# Patient Record
Sex: MALE | Race: WHITE | Employment: FULL TIME | URBAN - METROPOLITAN AREA
[De-identification: names, ages, dates, MRNs, and addresses within clinical notes are randomized per-mention and may not be internally consistent; named-entity substitution may affect disease eponyms.]

---
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
## 2019-09-26 ENCOUNTER — HOSPITAL ENCOUNTER (EMERGENCY)
Facility: CLINIC | Age: 19
Discharge: HOME | End: 2019-09-26
Attending: INTERNAL MEDICINE | Admitting: INTERNAL MEDICINE

## 2019-09-26 ENCOUNTER — APPOINTMENT (OUTPATIENT)
Dept: GENERAL RADIOLOGY | Facility: CLINIC | Age: 19
End: 2019-09-26
Attending: EMERGENCY MEDICINE
Payer: COMMERCIAL

## 2019-09-26 VITALS
DIASTOLIC BLOOD PRESSURE: 83 MMHG | SYSTOLIC BLOOD PRESSURE: 128 MMHG | TEMPERATURE: 99 F | RESPIRATION RATE: 18 BRPM | OXYGEN SATURATION: 100 % | HEART RATE: 56 BPM

## 2019-09-26 DIAGNOSIS — S52.601A CLOSED FRACTURE OF DISTAL ENDS OF RIGHT RADIUS AND ULNA, INITIAL ENCOUNTER: ICD-10-CM

## 2019-09-26 DIAGNOSIS — S52.501A CLOSED FRACTURE OF DISTAL ENDS OF RIGHT RADIUS AND ULNA, INITIAL ENCOUNTER: ICD-10-CM

## 2019-09-26 PROCEDURE — 99284 EMERGENCY DEPT VISIT MOD MDM: CPT | Mod: 25

## 2019-09-26 PROCEDURE — 73110 X-RAY EXAM OF WRIST: CPT | Mod: RT

## 2019-09-26 PROCEDURE — 25600 CLTX DST RDL FX/EPHYS SEP WO: CPT | Mod: RT

## 2019-09-26 PROCEDURE — 99284 EMERGENCY DEPT VISIT MOD MDM: CPT

## 2019-09-26 ASSESSMENT — ENCOUNTER SYMPTOMS
ARTHRALGIAS: 1
MYALGIAS: 1
ABDOMINAL PAIN: 0

## 2019-09-26 NOTE — ED AVS SNAPSHOT
[facility] Emergency Department  [address]  [facility] [address]  Phone:  [phone]  Fax:  [phone]                                    Rashaad Nicole   MRN: [id number]    Department:  [facility] Emergency Department   Date of Visit:  9/26/2019           After Visit Summary Signature Page    I have received my discharge instructions, and my questions have been answered. I have discussed any challenges I see with this plan with the nurse or doctor.    ..........................................................................................................................................  Patient/Patient Representative Signature      ..........................................................................................................................................  Patient Representative Print Name and Relationship to Patient    ..................................................               ................................................  Date                                   Time    ..........................................................................................................................................  Reviewed by Signature/Title    ...................................................              ..............................................  Date                                               Time          22EPIC Rev 08/18

## 2019-09-27 NOTE — ED TRIAGE NOTES
Patient was skateboarding and fell, braced himself with his hands. Now has pain and swelling to right wrist. Denies hitting head. ABCs intact. Took 600mg motrin at home.

## 2019-09-27 NOTE — ED PROVIDER NOTES
History     Chief Complaint:  Right Wrist Pain     The history is provided by the patient.      Rashaad Nicole is a right-hand dominant 19 year old male who presents with concerns for left wrist pain. He reports he fell off skateboarding while turning, resulting in him landing on his butt with his right arm outstretched behind him. Rashaad endorses pain and swelling since though denies any syncope, chest pain, abdominal pain, head injury, or other injuries. Patient reports taking 600 mg of ibuprofen prior to arrival.     Allergies:  No Known Drug Allergies    Medications:    Medications reviewed. No current medications.     Past Medical History:    Medical history reviewed. No pertinent medical history.    Past Surgical History:    Surgical history reviewed. No pertinent surgical history.    Family History:    Family history reviewed. No pertinent family history.     Social History:  Accompanied by mom.  Never Smoker     Review of Systems   Cardiovascular: Negative for chest pain.   Gastrointestinal: Negative for abdominal pain.   Musculoskeletal: Positive for arthralgias and myalgias.   Neurological: Negative for syncope.       Physical Exam   Patient Vitals for the past 24 hrs:   BP Temp Temp src Pulse Resp SpO2   09/26/19 2207 128/83 99  F (37.2  C) Oral 56 18 100 %     Physical Exam  Cardiovascular:      Pulses: Normal pulses.   Musculoskeletal:      Comments: Swelling, tenderness, decreased range of motion at right wrist.   Skin:     Findings: No abrasion, ecchymosis or laceration.   Neurological:      Mental Status: He is alert.      Sensory: No sensory deficit.   Psychiatric:         Behavior: Behavior is cooperative.         Emergency Department Course     Imaging:  XR Wrist Right G/E 3 Views  Nondisplaced fracture of the distal radial metaphysis. Minimal displaced ulnar styloid fracture.  Reading per radiology    Procedures:    Right upper Extremity Splint Placement     I applied a sugar-tong  fiberglass splint to his right upper extremity and after placement I checked and adjusted the fit to ensure proper positioning. Patient was more comfortable with splint in place. Sensation and circulation are intact after splint placement.    Emergency Department Course:  Nursing notes and vitals reviewed.  2303 The patient was sent for a XR while in the emergency department, results above.   2315 I performed an exam of the patient as documented above. Splint placed as above. I personally reviewed the imaging results with the patient and answered all related questions prior to discharge, anticipatory guidance given.    Impression & Plan      Medical Decision Making:  Rashaad Nicole is a 19 year old male who presents to the emergency department today for evaluation of right wrist injury.  He does have a nondisplaced fracture of the distal radius and an ulnar styloid fracture.  He was splinted as noted and placed in a sling.  I will have him follow-up with orthopedics in about 5 days for anticipated casting.  Splint instructions, return sooner if problems.    Diagnosis:    ICD-10-CM   1. Closed fracture of distal ends of right radius and ulna, initial encounter S52.501A     Disposition: Home    Scribe Disclosure:  I, Rio Carmen, am serving as a scribe at 11:15 PM on 9/26/2019 to document services personally performed by Lora Lu MD based on my observations and the provider's statements to me.    [facility] EMERGENCY DEPARTMENT       Lora Lu MD  09/27/19 0051

## 2019-09-27 NOTE — DISCHARGE INSTRUCTIONS
Discharge Instructions  Splint Care    You had a splint put on today to help protect your injury and help it heal.  Splints are used to treat things like strains, sprains, cuts and fractures (broken bones).    Be sure your splint is not too tight!  If you splint is too tight, it may cause loss of blood supply.  Signs of your splint being too tight include:  your arm or leg hurting a lot more; your fingers or toes getting numb, cold, pale or blue; or your child is crying, fussing or seeming restless.    Return to the Emergency Department right away if:  You have increased pain or pressure around the injury.  You have numbness, tingling, or cool, pale, or blue toes or fingers past the injury.  Your child is more fussy than normal, crying a lot, or restless.  Your splint becomes soft, breaks, or is wet.  Your splint begins to smell bad.  Your splint is cutting into your skin.    Home care:  Keep the injured area above the level of your heart while laying or sitting down.  This will help decrease the swelling and the pain.  Keep the splint dry.  Do not put objects down or inside the splint.  If there is an elastic bandage (Ace  wrap) holding the splint on this may be loosened slightly to relieve pressure or pain.  If pain continues return to the Emergency Department right away.  Do not remove your splint by yourself unless told to by your doctor.    Follow-up:  Sometimes the splint put on in the Emergency Department needs to be changed once the swelling has gone down and a more permanent cast needs to be placed.  This is usually done by a bone specialist doctor (Orthopedist).  Follow the instructions given to you by your doctor today.    X-rays:  X-rays done today were read by your doctor but will also be read by a radiologist.  We will contact you if the radiologist sees anything different on the x-ray.  Your regular doctor may also want to review your x-rays on follow-up.    You could have a fracture (break), even if  we told you your x-rays were normal. X-rays are not always certain, and some fractures are hard to see and may not show up right away.  Also, your x-ray may look like you have a fracture, even though you do not.  It is important to follow-up with your regular doctor.     If you were given a prescription for medicine here today, be sure to read all of the information (including the package insert) that comes with your prescription.  This will include important information about the medicine, its side effects, and any warnings that you need to know about.  The pharmacist who fills the prescription can provide more information and answer questions you may have about the medicine.  If you have questions or concerns that the pharmacist cannot address, please call or return to the Emergency Department.   Opioid Medication Information    Pain medications are among the most commonly prescribed medicines, so we are including this information for all our patients. If you did not receive pain medication or get a prescription for pain medicine, you can ignore it.     You may have been given a prescription for an opioid (narcotic) pain medicine and/or have received a pain medicine while here in the Emergency Department. These medicines can make you drowsy or impaired. You must not drive, operate dangerous equipment, or engage in any other dangerous activities while taking these medications. If you drive while taking these medications, you could be arrested for DUI, or driving under the influence. Do not drink any alcohol while you are taking these medications.     Opioid pain medications can cause addiction. If you have a history of chemical dependency of any type, you are at a higher risk of becoming addicted to pain medications.  Only take these prescribed medications to treat your pain when all other options have been tried. Take it for as short a time and as few doses as possible. Store your pain pills in a secure place, as  they are frequently stolen and provide a dangerous opportunity for children or visitors in your house to start abusing these powerful medications. We will not replace any lost or stolen medicine.  As soon as your pain is better, you should flush all your remaining medication.     Many prescription pain medications contain Tylenol  (acetaminophen), including Vicodin , Tylenol #3 , Norco , Lortab , and Percocet .  You should not take any extra pills of Tylenol  if you are using these prescription medications or you can get very sick.  Do not ever take more than 3000 mg of acetaminophen in any 24 hour period.    All opioids tend to cause constipation. Drink plenty of water and eat foods that have a lot of fiber, such as fruits, vegetables, prune juice, apple juice and high fiber cereal.  Take a laxative if you don t move your bowels at least every other day. Miralax , Milk of Magnesia, Colace , or Senna  can be used to keep you regular.      Remember that you can always come back to the Emergency Department if you are not able to see your regular doctor in the amount of time listed above, if you get any new symptoms, or if there is anything that worries you.

## 2019-10-24 ENCOUNTER — TRANSFERRED RECORDS (OUTPATIENT)
Dept: HEALTH INFORMATION MANAGEMENT | Facility: CLINIC | Age: 19
End: 2019-10-24

## 2020-06-12 ENCOUNTER — HOSPITAL ENCOUNTER (EMERGENCY)
Facility: CLINIC | Age: 20
Discharge: HOME | End: 2020-06-12
Attending: EMERGENCY MEDICINE | Admitting: EMERGENCY MEDICINE
Payer: COMMERCIAL

## 2020-06-12 ENCOUNTER — APPOINTMENT (OUTPATIENT)
Dept: GENERAL RADIOLOGY | Facility: CLINIC | Age: 20
End: 2020-06-12
Attending: EMERGENCY MEDICINE
Payer: COMMERCIAL

## 2020-06-12 VITALS
SYSTOLIC BLOOD PRESSURE: 119 MMHG | TEMPERATURE: 98 F | RESPIRATION RATE: 16 BRPM | HEART RATE: 88 BPM | DIASTOLIC BLOOD PRESSURE: 77 MMHG | OXYGEN SATURATION: 99 %

## 2020-06-12 DIAGNOSIS — S91.311A FOOT LACERATION, RIGHT, INITIAL ENCOUNTER: ICD-10-CM

## 2020-06-12 PROCEDURE — 12001 RPR S/N/AX/GEN/TRNK 2.5CM/<: CPT

## 2020-06-12 PROCEDURE — 99283 EMERGENCY DEPT VISIT LOW MDM: CPT | Mod: 25

## 2020-06-12 PROCEDURE — 73630 X-RAY EXAM OF FOOT: CPT | Mod: RT

## 2020-06-12 RX ORDER — LIDOCAINE HYDROCHLORIDE AND EPINEPHRINE 10; 10 MG/ML; UG/ML
INJECTION, SOLUTION INFILTRATION; PERINEURAL
Status: DISCONTINUED
Start: 2020-06-12 | End: 2020-06-12 | Stop reason: HOSPADM

## 2020-06-12 ASSESSMENT — ENCOUNTER SYMPTOMS
ARTHRALGIAS: 1
MYALGIAS: 0
WOUND: 1

## 2020-06-12 NOTE — ED PROVIDER NOTES
History     Chief Complaint:  Foot Injury    HPI  Rashaad Nicole is a 20 year old male who presents for evaluation of an injury to his left foot. The patient was pulling open a 40 lb metal gate and this ended up falling directly onto his foot. He has a laceration on the superior aspect of the foot but no other injuries. His last tetanus update was 2011.      Allergies:  No known drug allergies    Medications:    The patient is not currently taking any prescribed medications.    Past Medical History:    The patient does not have any past pertinent medical history.    Past Surgical History:    History reviewed. No pertinent surgical history.    Family History:    History reviewed. No pertinent family history.     Social History:  The patient arrives alone  Smoking: never  PCP: Dima Winston  Marital Status: Single [1]      Review of Systems   Musculoskeletal: Positive for arthralgias. Negative for myalgias.   Skin: Positive for wound.   All other systems reviewed and are negative.      Physical Exam     Patient Vitals for the past 24 hrs:   BP Temp Pulse Heart Rate Resp SpO2   06/12/20 0846 134/85 98  F (36.7  C) 101 101 18 99 %     Physical Exam  Pulmonary:      Effort: Pulmonary effort is normal.   Musculoskeletal:      Comments: There is mild swelling over the dorsum of the foot.  There is a 1.5 cm jagged diagonal-like laceration over the dorsum of the foot towards the distal aspect of the distal fourth and fifth metatarsal.  There is no nail or toe injury.  There is no step-off or deformity.  No foreign body noted.   Neurological:      Mental Status: He is alert.         Emergency Department Course     Imaging:  Radiology findings were communicated with the patient who voiced understanding of the findings.    XR Foot 3 views left  IMPRESSION: On the AP view there is a possible nondisplaced transverse   fracture through the fifth metatarsal head with intra-articular   extension. This is difficult to  confirm on the other views. There is   also question of a fracture in the sagittal plane with intra-articular   extension through the lateral aspect of the fifth proximal phalanx,   also only seen on the AP view. No other bony abnormalities  Reading per radiology    Procedures    Laceration Repair        LACERATION:  A simple and superficial clean 1.5 cm laceration.      LOCATION:  Superior aspect of left foot      FUNCTION:  Distally sensation, circulation, motor and tendon function are intact.      ANESTHESIA:  Local using lidocaine w epi total of 4 mLs      PREPARATION:  Irrigation and Scrubbing with Normal Saline and Shur Clens      DEBRIDEMENT:  wound explored, no foreign body found      CLOSURE:  Wound was closed with One Layer.  Skin closed with 7 x 5.0 Ethylon using single interrupted sutures.    Emergency Department Course:  Past medical records, nursing notes, and vitals reviewed.  0902: I performed an exam of the patient and obtained history, as documented above.     The patient's laceration was repaired as per the procedure note above    I personally reviewed the imaging results with the Patient and answered all related questions prior to discharge.     Nursing notes and vitals reviewed. I saw and examined the patient shortly after arrival to the ED bed.  I explained my medical impression and plan for care, and the patient consented to this plan.     Findings and plan explained to the Patient. Patient discharged home with instructions regarding supportive care, medications, and reasons to return. The importance of close follow-up was reviewed.   Impression & Plan      Medical Decision Making:  Rashaad Nicole is a 20 year old male who presents to the emergency department today for evaluation of foot injury.  Examination shows laceration x-rays are negative for bony injury.  Wound was cleansed and repaired by me and patient was discharged in improved condition.    Diagnosis:    ICD-10-CM   1. Foot  laceration, right, initial encounter  S91.311A       Disposition:   discharged to home      Scribe Disclosure:  I, Ellen Junefransico, am serving as a scribe at 9:02 AM on 6/12/2020 to document services personally performed by Johann Gibson MD based on my observations and the provider's statements to me.    [facility] EMERGENCY DEPARTMENT     Johann Gibson MD  06/14/20 6038

## 2020-06-12 NOTE — DISCHARGE INSTRUCTIONS
Keep wound clean and dry return with concern for infection things like redness swelling or pus discharge from the wound.  Sutures are to be removed in 10 to 14 days.